# Patient Record
Sex: FEMALE | Race: WHITE
[De-identification: names, ages, dates, MRNs, and addresses within clinical notes are randomized per-mention and may not be internally consistent; named-entity substitution may affect disease eponyms.]

---

## 2018-02-26 ENCOUNTER — HOSPITAL ENCOUNTER (EMERGENCY)
Dept: HOSPITAL 25 - UCCORT | Age: 53
Discharge: HOME | End: 2018-02-26
Payer: COMMERCIAL

## 2018-02-26 VITALS — DIASTOLIC BLOOD PRESSURE: 65 MMHG | SYSTOLIC BLOOD PRESSURE: 99 MMHG

## 2018-02-26 DIAGNOSIS — H10.9: Primary | ICD-10-CM

## 2018-02-26 DIAGNOSIS — Z87.891: ICD-10-CM

## 2018-02-26 PROCEDURE — 99212 OFFICE O/P EST SF 10 MIN: CPT

## 2018-02-26 PROCEDURE — G0463 HOSPITAL OUTPT CLINIC VISIT: HCPCS

## 2018-02-26 NOTE — UC
Eye Complaint HPI





- HPI Summary


HPI Summary: 





red right eye for the pst few days, no drainage no pain, hx of allergies





- History of Current Complaint


Chief Complaint: UCEye


Stated Complaint: BILATERAL EYE COMPLAINT


Time Seen by Provider: 02/26/18 14:17


Hx Obtained From: Patient


Hx Last Menstrual Period: 2/4/18


Pregnant?: No


Onset/Duration: Sudden Onset, Lasting Minutes


Timing: Constant


Severity Initially: Mild


Severity Currently: Mild


Pain Intensity: 0


Location of Injury: Conjunctiva





- Allergies/Home Medications


Allergies/Adverse Reactions: 


 Allergies











Allergy/AdvReac Type Severity Reaction Status Date / Time


 


No Known Allergies Allergy   Verified 02/26/18 13:07














PMH/Surg Hx/FS Hx/Imm Hx


Previously Healthy: Yes





- Surgical History


Surgical History: Yes


Surgery Procedure, Year, and Place: c section x 2.  jaw fx x 2





- Family History


Known Family History: Positive: Cardiac Disease, Hypertension





- Social History


Alcohol Use: None


Substance Use Type: None


Smoking Status (MU): Former Smoker


Type: Cigarettes


When Did the Patient Quit Smoking/Using Tobacco: 2010





Review of Systems


Skin: Negative


Eyes: Eye Redness


ENT: Negative


Respiratory: Negative


Cardiovascular: Negative


Gastrointestinal: Negative


Genitourinary: Negative


Motor: Negative


Neurovascular: Negative


Musculoskeletal: Negative


Neurological: Negative


Psychological: Negative


Is Patient Immunocompromised?: No


All Other Systems Reviewed And Are Negative: Yes





Physical Exam


Triage Information Reviewed: Yes


Appearance: Well-Appearing, No Pain Distress, Well-Nourished


Vital Signs: 


 Initial Vital Signs











Temp  98.5 F   02/26/18 13:08


 


Pulse  58   02/26/18 13:08


 


Resp  14   02/26/18 13:08


 


BP  99/65   02/26/18 13:08


 


Pulse Ox  100   02/26/18 13:08











Vital Signs Reviewed: Yes


Eye Exam: Normal


Eyes: Positive: Conjunctiva Inflamed


ENT Exam: Normal


Dental Exam: Normal


Neck exam: Normal


Respiratory Exam: Normal


Cardiovascular Exam: Normal


Cardiovascular: Positive: RRR, No Murmur, Pulses Normal


Abdominal Exam: Normal


Abdomen Description: Positive: Nontender, No Organomegaly, Soft


Bowel Sounds: Positive: Present


Musculoskeletal Exam: Normal


Musculoskeletal: Positive: Strength Intact, ROM Intact, No Edema


Neurological Exam: Normal


Neurological: Positive: Alert, Muscle Tone Normal





Eye Complaint Course/Dx





- Course


Course Of Treatment: hx obtained, exam performed ,meds reviewed, recommend 

trying abx cream for 2 days, if not improving stop,





- Differential Dx/Diagnosis


Differential Diagnosis/HQI/PQRI: Conjunctivitis


Provider Diagnoses: conjucntivitis





Discharge





- Discharge Plan


Condition: Stable


Disposition: HOME


Prescriptions: 


Erythromycin OPHTH.OINT* [Ilotycin OPHTH.OINT*] 1 applic RIGHT EYE TID #1 tube


Patient Education Materials:  Conjunctivitis (ED)


Referrals: 


Tracey STRAUSS,Vidya BECKMAN [Primary Care Provider] - 


Additional Instructions: 


Use medication for 2-3 days,